# Patient Record
Sex: MALE | Race: WHITE | NOT HISPANIC OR LATINO | Employment: FULL TIME | URBAN - METROPOLITAN AREA
[De-identification: names, ages, dates, MRNs, and addresses within clinical notes are randomized per-mention and may not be internally consistent; named-entity substitution may affect disease eponyms.]

---

## 2023-10-18 ENCOUNTER — APPOINTMENT (OUTPATIENT)
Dept: OTOLARYNGOLOGY | Facility: CLINIC | Age: 38
End: 2023-10-18
Payer: COMMERCIAL

## 2023-11-08 ENCOUNTER — OFFICE VISIT (OUTPATIENT)
Dept: OTOLARYNGOLOGY | Facility: CLINIC | Age: 38
End: 2023-11-08
Payer: COMMERCIAL

## 2023-11-08 VITALS
SYSTOLIC BLOOD PRESSURE: 134 MMHG | RESPIRATION RATE: 16 BRPM | BODY MASS INDEX: 33.96 KG/M2 | DIASTOLIC BLOOD PRESSURE: 79 MMHG | HEART RATE: 80 BPM | TEMPERATURE: 97.9 F | WEIGHT: 237.25 LBS | HEIGHT: 70 IN

## 2023-11-08 DIAGNOSIS — J35.01 CHRONIC TONSILLITIS: Primary | ICD-10-CM

## 2023-11-08 DIAGNOSIS — J35.8 TONSIL STONE: ICD-10-CM

## 2023-11-08 PROCEDURE — 99214 OFFICE O/P EST MOD 30 MIN: CPT | Performed by: STUDENT IN AN ORGANIZED HEALTH CARE EDUCATION/TRAINING PROGRAM

## 2023-11-08 PROCEDURE — 1036F TOBACCO NON-USER: CPT | Performed by: STUDENT IN AN ORGANIZED HEALTH CARE EDUCATION/TRAINING PROGRAM

## 2023-11-08 RX ORDER — TESTOSTERONE CYPIONATE 200 MG/ML
INJECTION, SOLUTION INTRAMUSCULAR
COMMUNITY
Start: 2023-10-13

## 2023-11-08 RX ORDER — CLOMIPHENE CITRATE 50 MG/1
50 TABLET ORAL DAILY
COMMUNITY

## 2023-11-08 RX ORDER — ANASTROZOLE 1 MG/1
1 TABLET ORAL DAILY
COMMUNITY

## 2023-11-08 RX ORDER — SODIUM CHLORIDE, SODIUM LACTATE, POTASSIUM CHLORIDE, CALCIUM CHLORIDE 600; 310; 30; 20 MG/100ML; MG/100ML; MG/100ML; MG/100ML
100 INJECTION, SOLUTION INTRAVENOUS CONTINUOUS
Status: CANCELLED | OUTPATIENT
Start: 2024-03-22

## 2023-11-08 SDOH — ECONOMIC STABILITY: FOOD INSECURITY: WITHIN THE PAST 12 MONTHS, YOU WORRIED THAT YOUR FOOD WOULD RUN OUT BEFORE YOU GOT MONEY TO BUY MORE.: NEVER TRUE

## 2023-11-08 SDOH — ECONOMIC STABILITY: HOUSING INSECURITY
IN THE LAST 12 MONTHS, WAS THERE A TIME WHEN YOU DID NOT HAVE A STEADY PLACE TO SLEEP OR SLEPT IN A SHELTER (INCLUDING NOW)?: NO

## 2023-11-08 SDOH — ECONOMIC STABILITY: INCOME INSECURITY: IN THE LAST 12 MONTHS, WAS THERE A TIME WHEN YOU WERE NOT ABLE TO PAY THE MORTGAGE OR RENT ON TIME?: NO

## 2023-11-08 SDOH — ECONOMIC STABILITY: FOOD INSECURITY: WITHIN THE PAST 12 MONTHS, THE FOOD YOU BOUGHT JUST DIDN'T LAST AND YOU DIDN'T HAVE MONEY TO GET MORE.: NEVER TRUE

## 2023-11-08 ASSESSMENT — COLUMBIA-SUICIDE SEVERITY RATING SCALE - C-SSRS
1. IN THE PAST MONTH, HAVE YOU WISHED YOU WERE DEAD OR WISHED YOU COULD GO TO SLEEP AND NOT WAKE UP?: NO
2. HAVE YOU ACTUALLY HAD ANY THOUGHTS OF KILLING YOURSELF?: NO
6. HAVE YOU EVER DONE ANYTHING, STARTED TO DO ANYTHING, OR PREPARED TO DO ANYTHING TO END YOUR LIFE?: NO

## 2023-11-08 ASSESSMENT — PATIENT HEALTH QUESTIONNAIRE - PHQ9
1. LITTLE INTEREST OR PLEASURE IN DOING THINGS: NOT AT ALL
2. FEELING DOWN, DEPRESSED OR HOPELESS: NOT AT ALL
SUM OF ALL RESPONSES TO PHQ9 QUESTIONS 1 AND 2: 0

## 2023-11-08 ASSESSMENT — ENCOUNTER SYMPTOMS
DEPRESSION: 0
OCCASIONAL FEELINGS OF UNSTEADINESS: 0
LOSS OF SENSATION IN FEET: 0

## 2023-11-08 ASSESSMENT — PAIN SCALES - GENERAL: PAINLEVEL: 0-NO PAIN

## 2023-11-08 NOTE — PROGRESS NOTES
SUBJECTIVE  Patient ID: Addison Ferrera is a 38 y.o. male who presents for Follow-up.    Brief history: 35-year-old male referred by my colleague, Mere Saba, for evaluation of tonsil stones. Patient was last seen by my colleague in October 2020.     On review of the chart, the patient reports roughly 6-8 months of recurrent, left-sided tonsil stones which he finds bothersome. At his last visit, a tonsil stone was suctioned from the left tonsil with improvement in symptoms. In addition, his partner has appreciated apneic episodes at night. He reports constant sensation that there is something at the back of his throat. He doesn't report significant pain. Is starting to have left-sided stones as well. Has not had improvement with salt water gargles. No previous issues with tonsil infections.     Update 1/4/2021:  At last visit started course of Augmentin for recurrent tonsil stones and chronic tonsillitis after deep cleaning of large tonsil stone from the left tonsil. Today patient reports that since our visit he has coughed up one small piece of stone. Finished his antibiotic course and was gargling since. He has not had a globus sensation since our visit.     Update 8/11/2021:  Follow up for chronic tonsillitis. He reports continued issues with weekly tonsilloliths. He is interested in reexploring tonsillectomy.  He was also recently seen in the the ED. He tried some ear wax drops for aural fullness. However, he developed right otalgia and saw an urgent care who prescribed Cortisporin eardrops and amoxicillin. Unfortunately, he had persistent pain and presented to the [facility] ED where he was switched to ofloxacin eardrops and instructed to complete amoxicillin course. He has since had slow improvement in otalgia although he has some itching and persistent otorrhea. He notes persistent aural fullness and muffled hearing.     Update 11/8/2023:  Follow-up for chronic tonsillitis and tonsil stones.  Patient  reports he will get tonsil stones from the left tonsil every 2 weeks or so.  Has already trialed multiple medical therapy.  Returning today to discuss surgical excision.    OBJECTIVE  Physical Exam  CONSTITUTIONAL: Well appearing male who appears stated age.  PSYCHIATRIC: Alert, appropriate mood and affect.  RESPIRATORY: Normal inspiration and expiration and chest wall expansion; no use of accessory muscles to breathe.  VOICE: Clear speech without hoarseness. No stridor nor stertor.  HEAD, FACE, AND SKIN: Symmetric facial features.  EARS: External ears were normally formed with no lesions. The external auditory canals were clear. The tympanic membranes were intact and in the neutral position. No significant retraction pockets, effusions, nor hemotympanum were appreciated.  OROPHARYNX: No lesion nor mucosal abnormality. The uvula was normal appearing.  Tonsils are 2+ and cryptic.  No tonsil stones noted today.    No anemia appreciated on outside CBC 5/13/2022    ASSESSMENT/PLAN  Diagnoses and all orders for this visit:  Chronic tonsillitis  -     Case Request Operating Room: Tonsillectomy and Adenoidectomy, Tonsillectomy; Standing  -     Coagulation Screen; Future  -     CBC; Future  -     Basic Metabolic Panel; Future  Tonsil stone  Other orders  -     Place in outpatient/hospital ambulatory surgery; Standing  -     Full code; Standing  -     Vital Signs; Standing  -     Pulse oximetry, spot; Standing  -     Insert peripheral IV; Standing  -     Saline lock IV; Standing  -     lactated Ringer's infusion      38 y.o. male with symptoms and clinical exam findings of chronic tonsillitis and significant tonsilloliths.     1. Chronic tonsillitis, tonsilloliths  At his initial visit an extremely large tonsillolith was resected from a deep right tonsillar crypt. He has since been experiencing roughly biweekly tonsilloliths. The patient's findings including physical exam were discussed. All options including continued  observation, medical management, and surgical management were discussed. We discussed tonsillectomy with possible adenoidectomy. The risks, benefits, and alternatives to these procedures were discussed with the patient. Risks discussed included but were not limited to pain, dehydration, weight loss, bad breath, bleeding, including life-threatening bleeding, risks of anesthesia, and other unpredictable risks. The patient understands these risks and wishes to proceed; informed consent was signed.     This note was created using speech recognition transcription software. Despite proofreading, typographical errors may be present that affect the meaning of the content. Please contact my office with any questions.

## 2024-03-18 ENCOUNTER — PRE-ADMISSION TESTING (OUTPATIENT)
Dept: PREADMISSION TESTING | Facility: HOSPITAL | Age: 39
End: 2024-03-18
Payer: COMMERCIAL

## 2024-03-18 VITALS
SYSTOLIC BLOOD PRESSURE: 134 MMHG | HEART RATE: 71 BPM | WEIGHT: 238.1 LBS | HEIGHT: 70 IN | TEMPERATURE: 95.9 F | OXYGEN SATURATION: 98 % | BODY MASS INDEX: 34.09 KG/M2 | RESPIRATION RATE: 18 BRPM | DIASTOLIC BLOOD PRESSURE: 85 MMHG

## 2024-03-18 DIAGNOSIS — J35.01 CHRONIC TONSILLITIS: ICD-10-CM

## 2024-03-18 LAB
ANION GAP SERPL CALC-SCNC: 14 MMOL/L (ref 10–20)
APTT PPP: 35 SECONDS (ref 27–38)
BUN SERPL-MCNC: 12 MG/DL (ref 6–23)
CALCIUM SERPL-MCNC: 9 MG/DL (ref 8.6–10.3)
CHLORIDE SERPL-SCNC: 105 MMOL/L (ref 98–107)
CO2 SERPL-SCNC: 30 MMOL/L (ref 21–32)
CREAT SERPL-MCNC: 0.99 MG/DL (ref 0.5–1.3)
EGFRCR SERPLBLD CKD-EPI 2021: >90 ML/MIN/1.73M*2
ERYTHROCYTE [DISTWIDTH] IN BLOOD BY AUTOMATED COUNT: 13.9 % (ref 11.5–14.5)
GLUCOSE SERPL-MCNC: 95 MG/DL (ref 74–99)
HCT VFR BLD AUTO: 47.5 % (ref 41–52)
HGB BLD-MCNC: 15.5 G/DL (ref 13.5–17.5)
INR PPP: 1.1 (ref 0.9–1.1)
MCH RBC QN AUTO: 29.5 PG (ref 26–34)
MCHC RBC AUTO-ENTMCNC: 32.6 G/DL (ref 32–36)
MCV RBC AUTO: 91 FL (ref 80–100)
NRBC BLD-RTO: 0 /100 WBCS (ref 0–0)
PLATELET # BLD AUTO: 247 X10*3/UL (ref 150–450)
POTASSIUM SERPL-SCNC: 4.5 MMOL/L (ref 3.5–5.3)
PROTHROMBIN TIME: 12.5 SECONDS (ref 9.8–12.8)
RBC # BLD AUTO: 5.25 X10*6/UL (ref 4.5–5.9)
SODIUM SERPL-SCNC: 144 MMOL/L (ref 136–145)
WBC # BLD AUTO: 7.3 X10*3/UL (ref 4.4–11.3)

## 2024-03-18 PROCEDURE — 36415 COLL VENOUS BLD VENIPUNCTURE: CPT

## 2024-03-18 PROCEDURE — 85027 COMPLETE CBC AUTOMATED: CPT

## 2024-03-18 PROCEDURE — 80048 BASIC METABOLIC PNL TOTAL CA: CPT

## 2024-03-18 PROCEDURE — 85610 PROTHROMBIN TIME: CPT

## 2024-03-18 PROCEDURE — 99203 OFFICE O/P NEW LOW 30 MIN: CPT | Performed by: NURSE PRACTITIONER

## 2024-03-18 ASSESSMENT — DUKE ACTIVITY SCORE INDEX (DASI)
CAN YOU PARTICIPATE IN STRENOUS SPORTS LIKE SWIMMING, SINGLES TENNIS, FOOTBALL, BASKETBALL, OR SKIING: YES
TOTAL_SCORE: 58.2
CAN YOU HAVE SEXUAL RELATIONS: YES
CAN YOU WALK INDOORS, SUCH AS AROUND YOUR HOUSE: YES
CAN YOU RUN A SHORT DISTANCE: YES
DASI METS SCORE: 9.9
CAN YOU WALK A BLOCK OR TWO ON LEVEL GROUND: YES
CAN YOU DO YARD WORK LIKE RAKING LEAVES, WEEDING OR PUSHING A MOWER: YES
CAN YOU DO HEAVY WORK AROUND THE HOUSE LIKE SCRUBBING FLOORS OR LIFTING AND MOVING HEAVY FURNITURE: YES
CAN YOU DO MODERATE WORK AROUND THE HOUSE LIKE VACUUMING, SWEEPING FLOORS OR CARRYING GROCERIES: YES
CAN YOU DO LIGHT WORK AROUND THE HOUSE LIKE DUSTING OR WASHING DISHES: YES
CAN YOU TAKE CARE OF YOURSELF (EAT, DRESS, BATHE, OR USE TOILET): YES
CAN YOU PARTICIPATE IN MODERATE RECREATIONAL ACTIVITIES LIKE GOLF, BOWLING, DANCING, DOUBLES TENNIS OR THROWING A BASEBALL OR FOOTBALL: YES
CAN YOU CLIMB A FLIGHT OF STAIRS OR WALK UP A HILL: YES

## 2024-03-18 ASSESSMENT — PAIN SCALES - GENERAL: PAINLEVEL_OUTOF10: 0 - NO PAIN

## 2024-03-18 ASSESSMENT — PAIN - FUNCTIONAL ASSESSMENT: PAIN_FUNCTIONAL_ASSESSMENT: 0-10

## 2024-03-18 NOTE — CPM/PAT H&P
"CPM/PAT Evaluation       Name: Addison Ferrera (Addison Ferrera)  /Age: 1985/38 y.o.     In-Person       Chief Complaint: chronic tonsillitis    38 yr old male with c/o chronic tonsillitis.  Reports ongoing bothersome symptoms including tonsils stones for past 2 years.  States coughing up tonsil stones \"white spongy nastiness\" every 3 weeks; causing much discomfort as food catches in pockets; adding stones get so large at times and fears they will causing choking.  Denies difficulty swallowing, infections but states concern over snoring during sleep.  Has tried medications, gargles and rinses with no lasting relief of symptoms.  Reports remaining otherwise physically active, denies cardiac or respiratory symptoms.  Denies past issues with anesthesia.         Past Medical History:   Diagnosis Date    Personal history of (healed) traumatic fracture     History of fracture of phalanx of finger       Past Surgical History:   Procedure Laterality Date    OTHER SURGICAL HISTORY  2020    Closed reduction-percutaneous pinning    VASECTOMY         Patient Sexual activity questions deferred to the physician.    Family History   Problem Relation Name Age of Onset    Heart attack Father      Cancer Father         No Known Allergies    Prior to Admission medications    Medication Sig Start Date End Date Taking? Authorizing Provider   anastrozole (Arimidex) 1 mg tablet Take 1 tablet (1 mg total) by mouth once daily.  Swallow whole with a drink of water.    Historical Provider, MD   clomiPHENE (Clomid) 50 mg tablet Take 1 tablet (50 mg) by mouth once daily.    Historical Provider, MD   testosterone cypionate (Depo-Testosterone) 200 mg/mL injection INJECT 0.9 ML (180 MG) INTO THE MUSCLE ONCE WEEKLY 10/13/23   Historical Provider, MD        Review of Systems    Constitutional: no fever, no chills and not feeling poorly.   Eyes: no eyesight problems, reports color blindness.   ENT: see HPI, no hearing loss, " no nosebleeds.    Cardiovascular: no chest pain, no palpitations and no extremity edema.   Respiratory: no shortness of breath, no wheezing, no cough and no sob with exertion.   Gastrointestinal: negative for abdominal pain, blood in stools or changes in bowel habits   Genitourinary: negative for dysuria, incontinence or changes in urinary habits   Musculoskeletal: no arthralgias and no gait abnormality.   Integumentary: negative for lesions, rash or itching.   Neurological: negative for confusion, dizziness, fainting or difficulty walking.   Psychiatric: not suicidal, no anxiety and no depression.   All other systems have been reviewed and are negative for complaint.     Physical Exam  Constitutional:       General: No acute distress.     Aox3, pleasant and cooperative, appropriate mood and eye contact   HENT:      Head: Normocephalic.      Mouth/Throat: Mucous membranes moist & pink  Eyes:      Vision grossly intact, PERRLA   Neck:      No carotid bruit, no JVD  Cardiovascular:      RRR, S1S2, no murmurs, rubs or gallops  Pulmonary:      Symmetric chest expansion, CTA, Room Air  Abdominal:      Soft non-tender, BSx4   Skin:     Warm, dry & intact   Extremities:      No gross deformities; normal gait   Neurological:      No focal deficit, Aox3, DUTTON x4  Psychiatric:      Pleasant & cooperative, appropriate affect    PAT AIRWAY:   Airway:     Mallampati::  II    TM distance::  >3 FB    Neck ROM::  Full   Some cracked teeth d/t teeth grinding  normal        Visit Vitals  /85   Pulse 71   Temp 35.5 °C (95.9 °F) (Tympanic)   Resp 18       DASI Risk Score      Flowsheet Row Most Recent Value   DASI SCORE 58.2   METS Score (Will be calculated only when all the questions are answered) 9.9          Caprini DVT Assessment    No data to display       Modified Frailty Index    No data to display       CHADS2 Stroke Risk         N/A 3 - 100%: High Risk   2 - 3%: Medium Risk   0 - 2%: Low Risk     Last Change: N/A           This score determines the patient's risk of having a stroke if the patient has atrial fibrillation.        This score is not applicable to this patient. Components are not calculated.          Revised Cardiac Risk Index    No data to display       Apfel Simplified Score    No data to display       Risk Analysis Index Results This Encounter    No data found in the last 1 encounters.       Stop Bang Score      Flowsheet Row Most Recent Value   Do you snore loudly? 1   Do you often feel tired or fatigued after your sleep? 0   Has anyone ever observed you stop breathing in your sleep? 0   Do you have or are you being treated for high blood pressure? 0   Recent BMI (Calculated) 34.2   Is BMI greater than 35 kg/m2? 0=No   Age older than 50 years old? 0=No   Gender - Male 1=Yes            Assessment and Plan:     Followed by otolaryngology, Chronic tonsillitis    Bilateral tonsillectomy and adenoidectomy w/Dr Sanchez on 3/22/2024

## 2024-03-18 NOTE — PREPROCEDURE INSTRUCTIONS
Medication List            Accurate as of March 18, 2024  1:52 PM. Always use your most recent med list.                anastrozole 1 mg tablet  Commonly known as: Arimidex  Medication Adjustments for Surgery: Continue until night before surgery     clomiPHENE 50 mg tablet  Commonly known as: Clomid  Medication Adjustments for Surgery: Continue until night before surgery     testosterone cypionate 200 mg/mL injection  Commonly known as: Depo-Testosterone  Medication Adjustments for Surgery: Continue until night before surgery                              NPO Instructions:    Do not eat any food after midnight the night before your surgery/procedure.    Additional Instructions:     Day of Surgery:  Wear  comfortable loose fitting clothing  Do not use moisturizers, creams, lotions or perfume  All jewelry and valuables should be left at home    No AM med day of surgery  Shower day of surgery No lotion/cream / jewlery day of surgery    Have designated  to take you home

## 2024-03-22 ENCOUNTER — ANESTHESIA (OUTPATIENT)
Dept: OPERATING ROOM | Facility: HOSPITAL | Age: 39
End: 2024-03-22
Payer: COMMERCIAL

## 2024-03-22 ENCOUNTER — HOSPITAL ENCOUNTER (OUTPATIENT)
Facility: HOSPITAL | Age: 39
Setting detail: OUTPATIENT SURGERY
Discharge: HOME | End: 2024-03-22
Attending: STUDENT IN AN ORGANIZED HEALTH CARE EDUCATION/TRAINING PROGRAM | Admitting: STUDENT IN AN ORGANIZED HEALTH CARE EDUCATION/TRAINING PROGRAM
Payer: COMMERCIAL

## 2024-03-22 ENCOUNTER — ANESTHESIA EVENT (OUTPATIENT)
Dept: OPERATING ROOM | Facility: HOSPITAL | Age: 39
End: 2024-03-22
Payer: COMMERCIAL

## 2024-03-22 VITALS
TEMPERATURE: 96.8 F | WEIGHT: 238.1 LBS | DIASTOLIC BLOOD PRESSURE: 67 MMHG | BODY MASS INDEX: 34.09 KG/M2 | HEART RATE: 91 BPM | SYSTOLIC BLOOD PRESSURE: 138 MMHG | HEIGHT: 70 IN | OXYGEN SATURATION: 96 % | RESPIRATION RATE: 16 BRPM

## 2024-03-22 DIAGNOSIS — J35.01 CHRONIC TONSILLITIS: Primary | ICD-10-CM

## 2024-03-22 DIAGNOSIS — G89.18 POSTOPERATIVE PAIN: ICD-10-CM

## 2024-03-22 PROCEDURE — A42821 PR REMOVE TONSILS/ADENOIDS,12+ Y/O: Performed by: ANESTHESIOLOGIST ASSISTANT

## 2024-03-22 PROCEDURE — 3600000008 HC OR TIME - EACH INCREMENTAL 1 MINUTE - PROCEDURE LEVEL THREE: Performed by: STUDENT IN AN ORGANIZED HEALTH CARE EDUCATION/TRAINING PROGRAM

## 2024-03-22 PROCEDURE — 88304 TISSUE EXAM BY PATHOLOGIST: CPT | Mod: TC,PARLAB | Performed by: STUDENT IN AN ORGANIZED HEALTH CARE EDUCATION/TRAINING PROGRAM

## 2024-03-22 PROCEDURE — 3700000001 HC GENERAL ANESTHESIA TIME - INITIAL BASE CHARGE: Performed by: STUDENT IN AN ORGANIZED HEALTH CARE EDUCATION/TRAINING PROGRAM

## 2024-03-22 PROCEDURE — 3700000002 HC GENERAL ANESTHESIA TIME - EACH INCREMENTAL 1 MINUTE: Performed by: STUDENT IN AN ORGANIZED HEALTH CARE EDUCATION/TRAINING PROGRAM

## 2024-03-22 PROCEDURE — A4217 STERILE WATER/SALINE, 500 ML: HCPCS | Performed by: STUDENT IN AN ORGANIZED HEALTH CARE EDUCATION/TRAINING PROGRAM

## 2024-03-22 PROCEDURE — 3600000003 HC OR TIME - INITIAL BASE CHARGE - PROCEDURE LEVEL THREE: Performed by: STUDENT IN AN ORGANIZED HEALTH CARE EDUCATION/TRAINING PROGRAM

## 2024-03-22 PROCEDURE — 2500000005 HC RX 250 GENERAL PHARMACY W/O HCPCS: Performed by: STUDENT IN AN ORGANIZED HEALTH CARE EDUCATION/TRAINING PROGRAM

## 2024-03-22 PROCEDURE — 7100000001 HC RECOVERY ROOM TIME - INITIAL BASE CHARGE: Performed by: STUDENT IN AN ORGANIZED HEALTH CARE EDUCATION/TRAINING PROGRAM

## 2024-03-22 PROCEDURE — 7100000002 HC RECOVERY ROOM TIME - EACH INCREMENTAL 1 MINUTE: Performed by: STUDENT IN AN ORGANIZED HEALTH CARE EDUCATION/TRAINING PROGRAM

## 2024-03-22 PROCEDURE — 2500000005 HC RX 250 GENERAL PHARMACY W/O HCPCS: Performed by: ANESTHESIOLOGIST ASSISTANT

## 2024-03-22 PROCEDURE — 2720000007 HC OR 272 NO HCPCS: Performed by: STUDENT IN AN ORGANIZED HEALTH CARE EDUCATION/TRAINING PROGRAM

## 2024-03-22 PROCEDURE — 2500000004 HC RX 250 GENERAL PHARMACY W/ HCPCS (ALT 636 FOR OP/ED): Performed by: STUDENT IN AN ORGANIZED HEALTH CARE EDUCATION/TRAINING PROGRAM

## 2024-03-22 PROCEDURE — 2500000001 HC RX 250 WO HCPCS SELF ADMINISTERED DRUGS (ALT 637 FOR MEDICARE OP): Performed by: STUDENT IN AN ORGANIZED HEALTH CARE EDUCATION/TRAINING PROGRAM

## 2024-03-22 PROCEDURE — 7100000010 HC PHASE TWO TIME - EACH INCREMENTAL 1 MINUTE: Performed by: STUDENT IN AN ORGANIZED HEALTH CARE EDUCATION/TRAINING PROGRAM

## 2024-03-22 PROCEDURE — A42821 PR REMOVE TONSILS/ADENOIDS,12+ Y/O: Performed by: ANESTHESIOLOGY

## 2024-03-22 PROCEDURE — 42821 REMOVE TONSILS AND ADENOIDS: CPT | Performed by: STUDENT IN AN ORGANIZED HEALTH CARE EDUCATION/TRAINING PROGRAM

## 2024-03-22 PROCEDURE — 7100000009 HC PHASE TWO TIME - INITIAL BASE CHARGE: Performed by: STUDENT IN AN ORGANIZED HEALTH CARE EDUCATION/TRAINING PROGRAM

## 2024-03-22 PROCEDURE — 88304 TISSUE EXAM BY PATHOLOGIST: CPT | Performed by: DENTIST

## 2024-03-22 PROCEDURE — 2500000004 HC RX 250 GENERAL PHARMACY W/ HCPCS (ALT 636 FOR OP/ED): Performed by: ANESTHESIOLOGIST ASSISTANT

## 2024-03-22 RX ORDER — LABETALOL HYDROCHLORIDE 5 MG/ML
5 INJECTION, SOLUTION INTRAVENOUS ONCE AS NEEDED
Status: DISCONTINUED | OUTPATIENT
Start: 2024-03-22 | End: 2024-03-22 | Stop reason: HOSPADM

## 2024-03-22 RX ORDER — DEXAMETHASONE SODIUM PHOSPHATE 100 MG/10ML
INJECTION INTRAMUSCULAR; INTRAVENOUS AS NEEDED
Status: DISCONTINUED | OUTPATIENT
Start: 2024-03-22 | End: 2024-03-22

## 2024-03-22 RX ORDER — PROPOFOL 10 MG/ML
INJECTION, EMULSION INTRAVENOUS AS NEEDED
Status: DISCONTINUED | OUTPATIENT
Start: 2024-03-22 | End: 2024-03-22

## 2024-03-22 RX ORDER — SODIUM CHLORIDE 0.9 G/100ML
IRRIGANT IRRIGATION AS NEEDED
Status: DISCONTINUED | OUTPATIENT
Start: 2024-03-22 | End: 2024-03-22 | Stop reason: HOSPADM

## 2024-03-22 RX ORDER — SODIUM CHLORIDE, SODIUM LACTATE, POTASSIUM CHLORIDE, CALCIUM CHLORIDE 600; 310; 30; 20 MG/100ML; MG/100ML; MG/100ML; MG/100ML
100 INJECTION, SOLUTION INTRAVENOUS CONTINUOUS
Status: DISCONTINUED | OUTPATIENT
Start: 2024-03-22 | End: 2024-03-22 | Stop reason: HOSPADM

## 2024-03-22 RX ORDER — BUPIVACAINE HYDROCHLORIDE 5 MG/ML
INJECTION, SOLUTION PERINEURAL AS NEEDED
Status: DISCONTINUED | OUTPATIENT
Start: 2024-03-22 | End: 2024-03-22 | Stop reason: HOSPADM

## 2024-03-22 RX ORDER — DIPHENHYDRAMINE HYDROCHLORIDE 50 MG/ML
12.5 INJECTION INTRAMUSCULAR; INTRAVENOUS ONCE AS NEEDED
Status: DISCONTINUED | OUTPATIENT
Start: 2024-03-22 | End: 2024-03-22 | Stop reason: HOSPADM

## 2024-03-22 RX ORDER — ACETAMINOPHEN 325 MG/1
650 TABLET ORAL EVERY 4 HOURS PRN
Status: DISCONTINUED | OUTPATIENT
Start: 2024-03-22 | End: 2024-03-22 | Stop reason: HOSPADM

## 2024-03-22 RX ORDER — LIDOCAINE HYDROCHLORIDE 20 MG/ML
INJECTION, SOLUTION INFILTRATION; PERINEURAL AS NEEDED
Status: DISCONTINUED | OUTPATIENT
Start: 2024-03-22 | End: 2024-03-22

## 2024-03-22 RX ORDER — POLYETHYLENE GLYCOL 3350 17 G/17G
17 POWDER, FOR SOLUTION ORAL DAILY
Qty: 14 PACKET | Refills: 0 | Status: SHIPPED | OUTPATIENT
Start: 2024-03-22 | End: 2024-04-05

## 2024-03-22 RX ORDER — MIDAZOLAM HYDROCHLORIDE 1 MG/ML
1 INJECTION, SOLUTION INTRAMUSCULAR; INTRAVENOUS ONCE AS NEEDED
Status: DISCONTINUED | OUTPATIENT
Start: 2024-03-22 | End: 2024-03-22 | Stop reason: HOSPADM

## 2024-03-22 RX ORDER — ACETAMINOPHEN 325 MG/1
650 TABLET ORAL EVERY 6 HOURS PRN
Qty: 80 TABLET | Refills: 1 | Status: SHIPPED | OUTPATIENT
Start: 2024-03-22

## 2024-03-22 RX ORDER — ONDANSETRON HYDROCHLORIDE 2 MG/ML
4 INJECTION, SOLUTION INTRAVENOUS ONCE AS NEEDED
Status: DISCONTINUED | OUTPATIENT
Start: 2024-03-22 | End: 2024-03-22 | Stop reason: HOSPADM

## 2024-03-22 RX ORDER — HYDRALAZINE HYDROCHLORIDE 20 MG/ML
5 INJECTION INTRAMUSCULAR; INTRAVENOUS EVERY 30 MIN PRN
Status: DISCONTINUED | OUTPATIENT
Start: 2024-03-22 | End: 2024-03-22 | Stop reason: HOSPADM

## 2024-03-22 RX ORDER — FENTANYL CITRATE 50 UG/ML
INJECTION, SOLUTION INTRAMUSCULAR; INTRAVENOUS AS NEEDED
Status: DISCONTINUED | OUTPATIENT
Start: 2024-03-22 | End: 2024-03-22

## 2024-03-22 RX ORDER — OXYMETAZOLINE HCL 0.05 %
SPRAY, NON-AEROSOL (ML) NASAL AS NEEDED
Status: DISCONTINUED | OUTPATIENT
Start: 2024-03-22 | End: 2024-03-22 | Stop reason: HOSPADM

## 2024-03-22 RX ORDER — MEPERIDINE HYDROCHLORIDE 25 MG/ML
12.5 INJECTION INTRAMUSCULAR; INTRAVENOUS; SUBCUTANEOUS EVERY 10 MIN PRN
Status: DISCONTINUED | OUTPATIENT
Start: 2024-03-22 | End: 2024-03-22 | Stop reason: HOSPADM

## 2024-03-22 RX ORDER — ROCURONIUM BROMIDE 10 MG/ML
INJECTION, SOLUTION INTRAVENOUS AS NEEDED
Status: DISCONTINUED | OUTPATIENT
Start: 2024-03-22 | End: 2024-03-22

## 2024-03-22 RX ORDER — OXYCODONE HYDROCHLORIDE 5 MG/1
5 TABLET ORAL EVERY 6 HOURS PRN
Qty: 28 TABLET | Refills: 0 | Status: SHIPPED | OUTPATIENT
Start: 2024-03-22 | End: 2024-03-29

## 2024-03-22 RX ORDER — LIDOCAINE HCL/PF 100 MG/5ML
SYRINGE (ML) INTRAVENOUS AS NEEDED
Status: DISCONTINUED | OUTPATIENT
Start: 2024-03-22 | End: 2024-03-22

## 2024-03-22 RX ORDER — MIDAZOLAM HYDROCHLORIDE 1 MG/ML
INJECTION, SOLUTION INTRAMUSCULAR; INTRAVENOUS AS NEEDED
Status: DISCONTINUED | OUTPATIENT
Start: 2024-03-22 | End: 2024-03-22

## 2024-03-22 RX ORDER — HYDROMORPHONE HYDROCHLORIDE 1 MG/ML
1 INJECTION, SOLUTION INTRAMUSCULAR; INTRAVENOUS; SUBCUTANEOUS EVERY 5 MIN PRN
Status: DISCONTINUED | OUTPATIENT
Start: 2024-03-22 | End: 2024-03-22 | Stop reason: HOSPADM

## 2024-03-22 RX ORDER — ONDANSETRON HYDROCHLORIDE 2 MG/ML
INJECTION, SOLUTION INTRAVENOUS AS NEEDED
Status: DISCONTINUED | OUTPATIENT
Start: 2024-03-22 | End: 2024-03-22

## 2024-03-22 RX ORDER — IBUPROFEN 600 MG/1
600 TABLET ORAL EVERY 6 HOURS PRN
Qty: 60 TABLET | Refills: 1 | Status: SHIPPED | OUTPATIENT
Start: 2024-03-22 | End: 2024-04-21

## 2024-03-22 RX ADMIN — DEXAMETHASONE SODIUM PHOSPHATE 8 MG: 10 INJECTION INTRAMUSCULAR; INTRAVENOUS at 07:41

## 2024-03-22 RX ADMIN — FENTANYL CITRATE 25 MCG: 50 INJECTION, SOLUTION INTRAMUSCULAR; INTRAVENOUS at 09:16

## 2024-03-22 RX ADMIN — PROPOFOL 40 MG: 10 INJECTION, EMULSION INTRAVENOUS at 07:39

## 2024-03-22 RX ADMIN — MIDAZOLAM 2 MG: 1 INJECTION INTRAMUSCULAR; INTRAVENOUS at 07:35

## 2024-03-22 RX ADMIN — PROPOFOL 30 MG: 10 INJECTION, EMULSION INTRAVENOUS at 08:11

## 2024-03-22 RX ADMIN — ONDANSETRON 4 MG: 2 INJECTION INTRAMUSCULAR; INTRAVENOUS at 08:55

## 2024-03-22 RX ADMIN — FENTANYL CITRATE 100 MCG: 50 INJECTION, SOLUTION INTRAMUSCULAR; INTRAVENOUS at 07:35

## 2024-03-22 RX ADMIN — ROCURONIUM BROMIDE 50 MG: 10 INJECTION, SOLUTION INTRAVENOUS at 07:35

## 2024-03-22 RX ADMIN — PROPOFOL 30 MG: 10 INJECTION, EMULSION INTRAVENOUS at 08:25

## 2024-03-22 RX ADMIN — PROPOFOL 20 MG: 10 INJECTION, EMULSION INTRAVENOUS at 08:31

## 2024-03-22 RX ADMIN — PROPOFOL 30 MG: 10 INJECTION, EMULSION INTRAVENOUS at 07:57

## 2024-03-22 RX ADMIN — LIDOCAINE HYDROCHLORIDE 100 MG: 20 INJECTION INTRAVENOUS at 07:35

## 2024-03-22 RX ADMIN — FENTANYL CITRATE 25 MCG: 50 INJECTION, SOLUTION INTRAMUSCULAR; INTRAVENOUS at 07:59

## 2024-03-22 RX ADMIN — PROPOFOL 30 MG: 10 INJECTION, EMULSION INTRAVENOUS at 08:40

## 2024-03-22 RX ADMIN — PROPOFOL 220 MG: 10 INJECTION, EMULSION INTRAVENOUS at 07:35

## 2024-03-22 RX ADMIN — SODIUM CHLORIDE, POTASSIUM CHLORIDE, SODIUM LACTATE AND CALCIUM CHLORIDE 100 ML/HR: 600; 310; 30; 20 INJECTION, SOLUTION INTRAVENOUS at 06:33

## 2024-03-22 RX ADMIN — SUGAMMADEX 200 MG: 100 INJECTION, SOLUTION INTRAVENOUS at 09:00

## 2024-03-22 RX ADMIN — ACETAMINOPHEN 650 MG: 325 TABLET ORAL at 10:46

## 2024-03-22 SDOH — HEALTH STABILITY: MENTAL HEALTH: CURRENT SMOKER: 0

## 2024-03-22 ASSESSMENT — PAIN DESCRIPTION - LOCATION: LOCATION: THROAT

## 2024-03-22 ASSESSMENT — PAIN SCALES - GENERAL
PAINLEVEL_OUTOF10: 5 - MODERATE PAIN
PAINLEVEL_OUTOF10: 3
PAINLEVEL_OUTOF10: 0 - NO PAIN
PAIN_LEVEL: 3
PAINLEVEL_OUTOF10: 0 - NO PAIN
PAINLEVEL_OUTOF10: 0 - NO PAIN
PAINLEVEL_OUTOF10: 2

## 2024-03-22 ASSESSMENT — PAIN - FUNCTIONAL ASSESSMENT
PAIN_FUNCTIONAL_ASSESSMENT: 0-10

## 2024-03-22 ASSESSMENT — COLUMBIA-SUICIDE SEVERITY RATING SCALE - C-SSRS
1. IN THE PAST MONTH, HAVE YOU WISHED YOU WERE DEAD OR WISHED YOU COULD GO TO SLEEP AND NOT WAKE UP?: NO
6. HAVE YOU EVER DONE ANYTHING, STARTED TO DO ANYTHING, OR PREPARED TO DO ANYTHING TO END YOUR LIFE?: NO
2. HAVE YOU ACTUALLY HAD ANY THOUGHTS OF KILLING YOURSELF?: NO

## 2024-03-22 NOTE — ANESTHESIA POSTPROCEDURE EVALUATION
Patient: Addison Ferrera    Procedure Summary       Date: 03/22/24 Room / Location: [facility] 04 / Virtual [facility]    Anesthesia Start: 0725 Anesthesia Stop:     Procedures:       BILATERAL TONSILLECTOMY AND (Bilateral)      ADENOIDECTOMY (Bilateral) Diagnosis:       Chronic tonsillitis      (Chronic tonsillitis [J35.01])    Surgeons: Jace Sanchez MD Responsible Provider: Jerardo Moreno MD    Anesthesia Type: general ASA Status: 2            Anesthesia Type: general    Vitals Value Taken Time   /86 03/22/24 0921   Temp 36 03/22/24 0921   Pulse 105 03/22/24 0920   Resp 16 03/22/24 0921   SpO2 99 % 03/22/24 0920   Vitals shown include unvalidated device data.    Anesthesia Post Evaluation    Patient location during evaluation: PACU  Patient participation: complete - patient participated  Level of consciousness: awake  Pain score: 3  Pain management: adequate  Airway patency: patent  Cardiovascular status: acceptable and blood pressure returned to baseline  Respiratory status: acceptable and face mask  Hydration status: acceptable  Postoperative Nausea and Vomiting: none      No notable events documented.

## 2024-03-22 NOTE — ANESTHESIA PREPROCEDURE EVALUATION
Patient: Addison Ferrera    Procedure Information       Date/Time: 03/22/24 0730    Procedures:       BILATERAL TONSILLECTOMY AND (Bilateral)      ADENOIDECTOMY (Bilateral)    Location: [facility] 04 / [facility]    Surgeons: Jace Sanchez MD            Relevant Problems   Anesthesia (within normal limits)      Other   (+) Chronic tonsillitis       Clinical information reviewed:   Tobacco  Allergies  Meds   Med Hx  Surg Hx   Fam Hx  Soc Hx        NPO Detail:  NPO/Void Status  Carbohydrate Drink Given Prior to Surgery? : N  Date of Last Liquid: 03/21/24  Time of Last Liquid: 2100  Date of Last Solid: 03/21/24  Time of Last Solid: 2100  Last Intake Type: Light meal         Physical Exam    Airway  Mallampati: III  TM distance: >3 FB  Neck ROM: full     Cardiovascular - normal exam     Dental        Pulmonary - normal exam     Abdominal            Anesthesia Plan    History of general anesthesia?: yes  History of complications of general anesthesia?: no    ASA 2     general     The patient is not a current smoker.    intravenous induction   Postoperative administration of opioids is intended.  Trial extubation is planned.  Anesthetic plan and risks discussed with patient.    Plan discussed with CAA.

## 2024-03-22 NOTE — OP NOTE
OPERATIVE NOTE    Date:  3/22/2024 OR Location: [facility]    Name: Addison Ferrera : 1985, Age: 38 y.o., MRN: [id number], Sex: male    Surgeons      Jace Sanchez MD    Resident/Fellow/Other Assistant  None were associated with this case.    Anesthesia: General  ASA: II  Anesthesia Staff: Anesthesiologist: Jerardo Moreno MD  C-AA: TROY Carpio  Staff: Circulator: Verona Pacheco RN  Relief Circulator: Esha Anderson RN  Scrub Person: Natalya Jackson RN; Tata Simon       Preoperative Diagnosis:  1.  Chronic tonsillitis  2.  Tonsil stones    Postoperative Diagnosis:  Same    Procedure Performed:  1.  Tonsillectomy with adenoidectomy, age >12 years old    Indications:  Addison Ferrera is a 38 y.o. male who presented with symptoms concerning for chronic tonsillitis and bothersome tonsil stones. The patient met criteria for consideration of surgical intervention.  The risks, benefits, and alternatives were discussed in the office.  See outpatient notes for further details.     Operative Findings:  Tonsils 2+ with mixed exophytic and endophytic component.  Tonsils were extremely scarred to the lateral walls and were falling apart during dissection consistent with history of chronic tonsillitis.  Significant tonsillolith debris appreciated.  Within the left tonsil we appreciated a small abscess.  Adenoid pad cryptic with exophytic portions that were cauterized.    Procedure:  The patient was seen in the preoperative area where consent was confirmed and all questions were answered. The patient was brought back to the operating room where a full team timeout was performed. The patient was induced and intubated by the anesthesia team. The table was turned 90 degrees. The patient was positioned with neck in extension.    A second confirmatory timeout was performed. A Mon-Taras mouth gag was used to open the mouth and elevate the patient´s head from the table. A red rubber catheter  was inserted in a nasal cavity and used to elevate the soft palate. The adenoids were evaluated using a mirror with findings as above; these were resected using suction cautery.  The red rubber catheter was removed. The tonsils and palate were evaluated with findings as described above. We used monopolar cautery to remove the tonsils, first the right and then the left, with suction cautery used for hemostasis. The mouth was irrigated with copious normal saline. The patient was taken out of elevation and the mouth allowed to relax. After an appropriate amount of time the mouth was reevaluated. Any further bleeding was then controlled.  Plain 0.5% bupivacaine was injected submucosally in the tonsillar beds bilaterally.    An orogastric tube was passed into the stomach and the gastric contents were evacuated.    All instrumentation was then removed from the patient who was then turned back and returned to the care of the anesthesia team for emergence and extubation. The patient was transported to the recovery area. There were no immediate complications appreciated.    Attending Attestation: I performed the procedure.    Specimens:   ID Type Source Tests Collected by Time   1 : LEFT TONSIL Tissue TONSIL RESECTION LEFT SURGICAL PATHOLOGY EXAM Jace Sanchez MD 3/22/2024 0816   2 : RIGHT TONSIL Tissue TONSIL RESECTION RIGHT SURGICAL PATHOLOGY EXAM Jace Sanchez MD 3/22/2024 0816     Estimated Blood Loss: 20 mL  Complications: none  Condition of the patient: Stable  Disposition: PACU    Jace Sanchez MD  [phone]

## 2024-03-22 NOTE — ANESTHESIA PROCEDURE NOTES
Airway  Date/Time: 3/22/2024 7:40 AM  Urgency: elective    Airway not difficult    Staffing  Performed: attending   Authorized by: Jerardo Moreno MD    Performed by: TROY Carpio  Patient location during procedure: OR    Indications and Patient Condition  Indications for airway management: anesthesia  Spontaneous ventilation: present  Preoxygenated: yes  Mask difficulty assessment: 2 - vent by mask + OA or adjuvant +/- NMBA  Planned trial extubation    Final Airway Details  Final airway type: endotracheal airway      Successful airway: ETT  Cuffed: yes   Successful intubation technique: direct laryngoscopy  Facilitating devices/methods: intubating stylet  Endotracheal tube insertion site: oral  Blade: Saul  Blade size: #4  ETT size (mm): 7.5  Cormack-Lehane Classification: grade IIa - partial view of glottis  Placement verified by: capnometry   Measured from: lips  ETT to lips (cm): 23  Number of attempts at approach: 1  Number of other approaches attempted: 0

## 2024-03-22 NOTE — DISCHARGE INSTRUCTIONS
Congratulations on having your tonsils removed! Please see below for some general recommendations:    Pain  You can expect to have an extremely sore throat for up to two weeks after tonsillectomy. For pain control take ibuprofen and acetaminophen together every 6 hours. For more severe pain you can take a narcotic, if one was prescribed for you, in addition to the ibuprofen and acetaminophen.    Eating and drinking  Make sure to stay hydrated and drink as many liquids as possible. It is normal to not eat solid food for several days following surgery - as long as you are drinking this is fine. You can eat whatever you like, but avoid sharp foods (like chips) or foods that are tough to chew (like steak). It is safe to eat if you can cut it with a fork.    Bleeding  A small amount of blood-tinged spit is normal after a tonsillectomy for the first several days. Some people have more serious bleeding after a tonsillectomy. This can occur anytime during your healing period up to two weeks from surgery.    If you have bleeding:  - Small amount of bleeding: Drink ice water and sit down and rest.  - Large amount of bleeding or bleeding that does not stop: Return to the emergency department.    Prevent bleeding: Do the following to prevent or reduce the risk of bleeding from your tonsil areas:  - Do not smoke or go to smoky areas after your surgery while your throat is healing. Smoke can irritate the healing tissue of your throat.  - Avoid drinking liquids or eating foods that are spicy or have sharp edges (such as chips).  - Avoid harsh gargling or tooth brushing. Gently brush your teeth and rinse your mouth as directed.    You may also have ear pain, numbness and tingling of your tongue, and low grade fevers for several days after surgery. All of these are normal and should resolve on their own.    For questions or concerns please give our office a call at [phone]. For emergency questions after hours there is an  answering service through our office number that can contact Dr. Sanchez. You can also call [facility] at [phone] and request to speak to the “ENT resident physician on call.”

## 2024-03-28 ENCOUNTER — TELEPHONE (OUTPATIENT)
Dept: OTOLARYNGOLOGY | Facility: CLINIC | Age: 39
End: 2024-03-28
Payer: COMMERCIAL

## 2024-03-28 NOTE — TELEPHONE ENCOUNTER
Called to check on patient post-operative from tonsillectomy on 3/22. Patient is doing well with pain controlled with tylenol/ibuprofen throughout the day and using prescribed oxycodone at bedtime. No bleeding noted. Per patient he is taking in copious amounts of fluids (Gatorade juice and water) and adequate soft foods. Patient encouraged to call with any questions or concerns before his post-op appointment on 5/1.

## 2024-03-30 LAB
LABORATORY COMMENT REPORT: NORMAL
PATH REPORT.FINAL DX SPEC: NORMAL
PATH REPORT.GROSS SPEC: NORMAL
PATH REPORT.RELEVANT HX SPEC: NORMAL
PATH REPORT.TOTAL CANCER: NORMAL
RESIDENT REVIEW: NORMAL

## 2024-05-01 ENCOUNTER — OFFICE VISIT (OUTPATIENT)
Dept: OTOLARYNGOLOGY | Facility: CLINIC | Age: 39
End: 2024-05-01
Payer: COMMERCIAL

## 2024-05-01 VITALS
HEART RATE: 65 BPM | BODY MASS INDEX: 34.53 KG/M2 | SYSTOLIC BLOOD PRESSURE: 120 MMHG | DIASTOLIC BLOOD PRESSURE: 71 MMHG | RESPIRATION RATE: 16 BRPM | HEIGHT: 69 IN | WEIGHT: 233.13 LBS | OXYGEN SATURATION: 98 % | TEMPERATURE: 97.9 F

## 2024-05-01 DIAGNOSIS — J35.01 CHRONIC TONSILLITIS: Primary | ICD-10-CM

## 2024-05-01 PROCEDURE — 99024 POSTOP FOLLOW-UP VISIT: CPT | Performed by: STUDENT IN AN ORGANIZED HEALTH CARE EDUCATION/TRAINING PROGRAM

## 2024-05-01 PROCEDURE — 1036F TOBACCO NON-USER: CPT | Performed by: STUDENT IN AN ORGANIZED HEALTH CARE EDUCATION/TRAINING PROGRAM

## 2024-05-01 ASSESSMENT — PATIENT HEALTH QUESTIONNAIRE - PHQ9
SUM OF ALL RESPONSES TO PHQ9 QUESTIONS 1 AND 2: 0
1. LITTLE INTEREST OR PLEASURE IN DOING THINGS: NOT AT ALL
2. FEELING DOWN, DEPRESSED OR HOPELESS: NOT AT ALL

## 2024-05-01 ASSESSMENT — ENCOUNTER SYMPTOMS
DEPRESSION: 0
LOSS OF SENSATION IN FEET: 0
OCCASIONAL FEELINGS OF UNSTEADINESS: 0

## 2024-05-01 ASSESSMENT — PAIN SCALES - GENERAL: PAINLEVEL: 0-NO PAIN

## 2024-05-01 NOTE — PROGRESS NOTES
SUBJECTIVE  Patient ID: Addison Ferrera is a 38 y.o. male who presents for Post-op.    Brief history: 35-year-old male referred by my colleague, Mere Saba, for evaluation of tonsil stones. Patient was last seen by my colleague in October 2020.     On review of the chart, the patient reports roughly 6-8 months of recurrent, left-sided tonsil stones which he finds bothersome. At his last visit, a tonsil stone was suctioned from the left tonsil with improvement in symptoms. In addition, his partner has appreciated apneic episodes at night. He reports constant sensation that there is something at the back of his throat. He doesn't report significant pain. Is starting to have left-sided stones as well. Has not had improvement with salt water gargles. No previous issues with tonsil infections.     Update 1/4/2021:  At last visit started course of Augmentin for recurrent tonsil stones and chronic tonsillitis after deep cleaning of large tonsil stone from the left tonsil. Today patient reports that since our visit he has coughed up one small piece of stone. Finished his antibiotic course and was gargling since. He has not had a globus sensation since our visit.     Update 8/11/2021:  Follow up for chronic tonsillitis. He reports continued issues with weekly tonsilloliths. He is interested in reexploring tonsillectomy.  He was also recently seen in the the ED. He tried some ear wax drops for aural fullness. However, he developed right otalgia and saw an urgent care who prescribed Cortisporin eardrops and amoxicillin. Unfortunately, he had persistent pain and presented to the [facility] ED where he was switched to ofloxacin eardrops and instructed to complete amoxicillin course. He has since had slow improvement in otalgia although he has some itching and persistent otorrhea. He notes persistent aural fullness and muffled hearing.     Update 11/8/2023:  Follow-up for chronic tonsillitis and tonsil stones.  Patient  reports he will get tonsil stones from the left tonsil every 2 weeks or so.  Has already trialed multiple medical therapy.  Returning today to discuss surgical excision.    Update 5/1/2024:  Postop visit status post tonsillectomy and adenoidectomy.  Patient reports that he had significant pain which resolved and an expected amount of time.  No bleeding.  Back to normal diet.  No concerns today.    OBJECTIVE  Physical Exam  CONSTITUTIONAL: Well appearing male who appears stated age.  PSYCHIATRIC: Alert, appropriate mood and affect.  RESPIRATORY: Normal inspiration and expiration and chest wall expansion; no use of accessory muscles to breathe.  VOICE: Clear speech without hoarseness. No stridor nor stertor.  HEAD, FACE, AND SKIN: Symmetric facial feature.  OROPHARYNX: No lesion nor mucosal abnormality. The uvula was normal appearing. The tonsilar fossae are well lateralized and healing well.  No granulation tissue noted.    ASSESSMENT/PLAN  Diagnoses and all orders for this visit:  Chronic tonsillitis        38 y.o. male with symptoms and clinical exam findings of chronic tonsillitis and significant tonsilloliths.     1. Chronic tonsillitis, tonsilloliths  At his initial visit an extremely large tonsillolith was resected from a deep right tonsillar crypt. He has since been experiencing roughly biweekly tonsilloliths.  He is now status post tonsillectomy and adenoidectomy 3/22/2024.  Intraoperatively patient had severely inflamed tonsils but fell apart during dissection as well as intratonsillar abscess on the left.    At today's postop visit he is doing very well.  He can see me as needed moving forward.    This note was created using speech recognition transcription software. Despite proofreading, typographical errors may be present that affect the meaning of the content. Please contact my office with any questions.

## (undated) DEVICE — TUBING, CLEAR N-COND, 5MM X 10, LF

## (undated) DEVICE — COAGULATOR, SUCTION, LECTROVAC, 10 FR, 6 IN

## (undated) DEVICE — Device

## (undated) DEVICE — SLEEVE, VASO PRESS, CALF GARMENT, MEDIUM, GREEN

## (undated) DEVICE — CATHETER, URETHRAL, ALL PURPOSE, 12FR

## (undated) DEVICE — SYRINGE, 60 CC, IRRIGATION, BULB, CONTRO-BULB, PAPER POUCH

## (undated) DEVICE — ELECTRODE, ELECTROSURGICAL, BLADE EXT 4 INCH, INSULATED

## (undated) DEVICE — SPONGE, TONSIL, DOUBLE STRUNG, LARGE, 1 IN

## (undated) DEVICE — SOLUTION, ANTI FOG, W/SPONGE, ENDOMATE

## (undated) DEVICE — TUBE, SALEM SUMP, 16 FR X 48IN, ENFIT

## (undated) DEVICE — BASIN KIT, SINGLE

## (undated) DEVICE — CORD, CAUTERY, BIOPOLAR FORCEP, 12FT